# Patient Record
Sex: FEMALE | Race: WHITE
[De-identification: names, ages, dates, MRNs, and addresses within clinical notes are randomized per-mention and may not be internally consistent; named-entity substitution may affect disease eponyms.]

---

## 2019-07-02 ENCOUNTER — HOSPITAL ENCOUNTER (OUTPATIENT)
Dept: HOSPITAL 75 - RAD FS | Age: 79
End: 2019-07-02
Attending: NURSE PRACTITIONER
Payer: MEDICARE

## 2019-07-02 DIAGNOSIS — S82.091D: Primary | ICD-10-CM

## 2019-07-02 PROCEDURE — 73562 X-RAY EXAM OF KNEE 3: CPT

## 2019-07-02 NOTE — DIAGNOSTIC IMAGING REPORT
PATIENT HISTORY: FRACTURE OF RIGHT PATELLA. 



TECHNIQUE: Three views of the right knee



COMPARISON: None



FINDINGS:



There is a minimally displaced transverse fracture through the

right mid patella. There may be mild healing changes, though the

fracture line remains clearly evident. There is moderate soft

tissue edema about the right knee.



IMPRESSION: 



Mild healing changes about the minimally displaced right patellar

fracture.



Dictated by: 



  Dictated on workstation # [id number]

## 2019-07-16 ENCOUNTER — HOSPITAL ENCOUNTER (OUTPATIENT)
Dept: HOSPITAL 75 - RAD FS | Age: 79
End: 2019-07-16
Attending: NURSE PRACTITIONER
Payer: MEDICARE

## 2019-07-16 DIAGNOSIS — S82.034D: Primary | ICD-10-CM

## 2019-07-16 PROCEDURE — 73562 X-RAY EXAM OF KNEE 3: CPT

## 2019-07-16 NOTE — DIAGNOSTIC IMAGING REPORT
INDICATION:  

Patella fracture, followup.



TECHNIQUE:  

Four views of the right knee.  



CORRELATION STUDY:  

07/02/2019.



FINDINGS: 

Predominantly transversely oriented fracture through the mid to

inferior pole of the patella is again demonstrated. Very slight

anterior offset at the anterior component appears unchanged.

There are perhaps very slight interval healing changes noted. The

fracture line, however, is still well visualized. Surrounding

soft tissue edema.  Visualized femur, tibia, and fibula are

intact.



IMPRESSION: 

No appreciable change in the alignment of the minimally displaced

fracture of the inferior patella. There may be a suggestion of

very early interval healing with slight blurring along the

fracture line. Associated moderate soft tissue edema.     



Dictated by: 



  Dictated on workstation # [id number]

## 2019-07-30 ENCOUNTER — HOSPITAL ENCOUNTER (OUTPATIENT)
Dept: HOSPITAL 75 - RAD FS | Age: 79
End: 2019-07-30
Attending: NURSE PRACTITIONER
Payer: MEDICARE

## 2019-07-30 DIAGNOSIS — S82.034D: Primary | ICD-10-CM

## 2019-07-30 PROCEDURE — 73562 X-RAY EXAM OF KNEE 3: CPT

## 2019-07-30 NOTE — DIAGNOSTIC IMAGING REPORT
INDICATION: Patellar fracture



COMPARISON: July 16, 2019.



TECHNIQUE: Three radiographs of the right knee dated July 30, 2019.



FINDINGS: Transversely oriented fracture involving the mid

inferior aspect of the patella is again identified. Fracture

plane persists. Overall alignment is unchanged since the prior

examination. No significant periosteal reaction identified this

time. Overlying soft tissue swelling is again identified. No new

fracture or dislocation. No destructive osseous process. No

suspicious radiopaque foreign body.



IMPRESSION: Minimally displaced fracture involving the inferior

pole of the patella is again identified, appearing stable from

the prior examination. No definite periosteal reaction is

identified to suggest healing. Therefore, recommend continued

radiographic followup to ensure healing.



Dictated by: 



  Dictated on workstation # [id number]

## 2019-08-20 ENCOUNTER — HOSPITAL ENCOUNTER (OUTPATIENT)
Dept: HOSPITAL 75 - RAD FS | Age: 79
End: 2019-08-20
Attending: NURSE PRACTITIONER
Payer: MEDICARE

## 2019-08-20 DIAGNOSIS — S82.034D: Primary | ICD-10-CM

## 2019-08-20 PROCEDURE — 73562 X-RAY EXAM OF KNEE 3: CPT

## 2019-08-20 NOTE — DIAGNOSTIC IMAGING REPORT
EXAMINATION: 

Right knee, 3 views.



HISTORY: 

Fracture.



COMPARISON:  

07/30/2019.



FINDINGS:

There is unchanged alignment of a transverse fracture of the

right patella. The degree of distraction is unchanged. The small

knee joint effusion is stable. No other fracture is seen. The

joint spaces are normal.



IMPRESSION:

Unchanged alignment of the transverse fracture of the patella

with unchanged minimal distraction.



Dictated by: 



  Dictated on workstation # [id number]